# Patient Record
Sex: FEMALE | Race: ASIAN | NOT HISPANIC OR LATINO | URBAN - METROPOLITAN AREA
[De-identification: names, ages, dates, MRNs, and addresses within clinical notes are randomized per-mention and may not be internally consistent; named-entity substitution may affect disease eponyms.]

---

## 2020-03-06 ENCOUNTER — WALK IN (OUTPATIENT)
Dept: URGENT CARE | Age: 20
End: 2020-03-06

## 2020-03-06 DIAGNOSIS — Z23 NEED FOR VACCINATION FOR MENINGOCOCCUS: Primary | ICD-10-CM

## 2020-03-06 PROCEDURE — 90471 IMMUNIZATION ADMIN: CPT | Performed by: NURSE PRACTITIONER

## 2020-03-06 PROCEDURE — 90620 MENB-4C VACCINE IM: CPT | Performed by: NURSE PRACTITIONER

## 2024-05-08 ENCOUNTER — OFFICE VISIT (OUTPATIENT)
Dept: FAMILY MEDICINE CLINIC | Facility: CLINIC | Age: 24
End: 2024-05-08
Payer: COMMERCIAL

## 2024-05-08 VITALS
BODY MASS INDEX: 17.3 KG/M2 | DIASTOLIC BLOOD PRESSURE: 84 MMHG | RESPIRATION RATE: 19 BRPM | WEIGHT: 94 LBS | OXYGEN SATURATION: 98 % | HEIGHT: 62 IN | SYSTOLIC BLOOD PRESSURE: 118 MMHG | HEART RATE: 78 BPM

## 2024-05-08 DIAGNOSIS — H00.019 HORDEOLUM EXTERNUM, UNSPECIFIED LATERALITY: ICD-10-CM

## 2024-05-08 DIAGNOSIS — Z71.84 TRAVEL ADVICE ENCOUNTER: ICD-10-CM

## 2024-05-08 DIAGNOSIS — Z76.89 ENCOUNTER TO ESTABLISH CARE: Primary | ICD-10-CM

## 2024-05-08 PROCEDURE — 99212 OFFICE O/P EST SF 10 MIN: CPT | Performed by: FAMILY MEDICINE

## 2024-05-08 RX ORDER — SPIRONOLACTONE 100 MG/1
TABLET, FILM COATED ORAL
COMMUNITY

## 2024-05-08 NOTE — H&P
HPI:    Sal Tobin is a 24 year old female presents clinic as a new patient to establish care.  Traveling to [place], town outside of [facility].  Would like to discuss recommendations.  Additionally, patient frequently gets styes.  She typically uses warm compresses and they eventually resolved. She would like to discuss prevention.     HISTORY:  Past Medical History:    Eczema    on my legs      History reviewed. No pertinent surgical history.   Family History   Problem Relation Age of Onset    Diabetes Father         Type 2    Hypertension Father     Lipids Father       Social History:   Social History     Socioeconomic History    Marital status: Single   Tobacco Use    Smoking status: Never    Smokeless tobacco: Never   Vaping Use    Vaping status: Never Used   Substance and Sexual Activity    Alcohol use: Never    Drug use: Never        Medications (Active prior to today's visit):  Current Outpatient Medications   Medication Sig Dispense Refill    spironolactone 100 MG Oral Tab          Allergies:  No Known Allergies      Depression Screening (PHQ-2/PHQ-9): Over the LAST 2 WEEKS   Little interest or pleasure in doing things: Not at all    Feeling down, depressed, or hopeless: Not at all    PHQ-2 SCORE: 0           ROS:   Review of Systems   All other systems reviewed and are negative.      PHYSICAL EXAM:     Vitals:    05/08/24 1118   BP: 118/84   BP Location: Left arm   Patient Position: Sitting   Cuff Size: adult   Pulse: 78   Resp: 19   SpO2: 98%   Weight: 94 lb (42.6 kg)   Height: 5' 2\" (1.575 m)     Physical Exam  Constitutional:       General: She is not in acute distress.  Cardiovascular:      Rate and Rhythm: Normal rate.   Pulmonary:      Effort: Pulmonary effort is normal. No respiratory distress.   Neurological:      Mental Status: She is alert.   Psychiatric:         Mood and Affect: Mood normal.         ASSESSMENT/PLAN:   (Z76.89) Encounter to establish care  (primary encounter  diagnosis)  (Z71.84) Travel advice encounter  Plan:   -Vaccines up-to-date.  Clean food/water practices discussed.  No need for malaria prophylaxis.  I suggested taking an antidiarrheal medication along with Tylenol, ibuprofen, etc.  Follow-up as needed    (H00.019) Hordeolum externum, unspecified laterality  Plan:   -Currently asymptomatic. Suggested a daily nondrowsy antihistamine like Claritin, Allegra, Zyrtec, etc.  Eyelash hygiene practices discussed.  Follow-up as needed.                 Responsible party/patient verbalized understanding of information discussed. No barriers to learning observed.            Orders This Visit:  Orders Placed This Encounter   Procedures    GARDASIL 9       Meds This Visit:  Requested Prescriptions      No prescriptions requested or ordered in this encounter       Imaging & Referrals:  HPV HUMAN PAPILLOMA VIRUS VACC 9 RONNIE 3 DOSE IM     Chaperone offered at visit today.     The 21st Century cures Act makes medical notes like these available to patients in the interest of transparency.  However, be advised that this is a medical document.  It is intended as peer to peer communication.  It is written in medical language and may contain abbreviations or verbiage that are unfamiliar.  It may appear blunt or direct.  Medical documents are intended to carry relevant information, facts as evident, and the clinical opinion of the practitioner.      This note was created by [employer] voice recognition. Errors in content may be related to improper recognition by the system; efforts to review and correct have been done but errors may still exist. Please contact me with any questions.       5/8/2024  Patrick Basurto MD